# Patient Record
Sex: FEMALE | Race: WHITE | Employment: FULL TIME | URBAN - METROPOLITAN AREA
[De-identification: names, ages, dates, MRNs, and addresses within clinical notes are randomized per-mention and may not be internally consistent; named-entity substitution may affect disease eponyms.]

---

## 2018-10-31 ENCOUNTER — CLINICAL SUPPORT (OUTPATIENT)
Dept: PRIMARY CARE CLINIC | Age: 33
End: 2018-10-31

## 2018-10-31 DIAGNOSIS — Z00.8 ENCOUNTER FOR BIOMETRIC SCREENING: ICD-10-CM

## 2018-10-31 DIAGNOSIS — Z23 ENCOUNTER FOR IMMUNIZATION: Primary | ICD-10-CM

## 2018-10-31 NOTE — PATIENT INSTRUCTIONS
Vaccine Information Statement    Influenza (Flu) Vaccine (Inactivated or Recombinant): What you need to know    Many Vaccine Information Statements are available in Arabic and other languages. See www.immunize.org/vis  Hojas de Información Sobre Vacunas están disponibles en Español y en muchos otros idiomas. Visite www.immunize.org/vis    1. Why get vaccinated? Influenza (flu) is a contagious disease that spreads around the [place] every year, usually between October and May. Flu is caused by influenza viruses, and is spread mainly by coughing, sneezing, and close contact. Anyone can get flu. Flu strikes suddenly and can last several days. Symptoms vary by age, but can include:   fever/chills   sore throat   muscle aches   fatigue   cough   headache    runny or stuffy nose    Flu can also lead to pneumonia and blood infections, and cause diarrhea and seizures in children. If you have a medical condition, such as heart or lung disease, flu can make it worse. Flu is more dangerous for some people. Infants and young children, people 72years of age and older, pregnant women, and people with certain health conditions or a weakened immune system are at greatest risk. Each year thousands of people in the [facility] die from flu, and many more are hospitalized. Flu vaccine can:   keep you from getting flu,   make flu less severe if you do get it, and   keep you from spreading flu to your family and other people. 2. Inactivated and recombinant flu vaccines    A dose of flu vaccine is recommended every flu season. Children 6 months through 6years of age may need two doses during the same flu season. Everyone else needs only one dose each flu season.        Some inactivated flu vaccines contain a very small amount of a mercury-based preservative called thimerosal. Studies have not shown thimerosal in vaccines to be harmful, but flu vaccines that do not contain thimerosal are available. There is no live flu virus in flu shots. They cannot cause the flu. There are many flu viruses, and they are always changing. Each year a new flu vaccine is made to protect against three or four viruses that are likely to cause disease in the upcoming flu season. But even when the vaccine doesnt exactly match these viruses, it may still provide some protection    Flu vaccine cannot prevent:   flu that is caused by a virus not covered by the vaccine, or   illnesses that look like flu but are not. It takes about 2 weeks for protection to develop after vaccination, and protection lasts through the flu season. 3. Some people should not get this vaccine    Tell the person who is giving you the vaccine:     If you have any severe, life-threatening allergies. If you ever had a life-threatening allergic reaction after a dose of flu vaccine, or have a severe allergy to any part of this vaccine, you may be advised not to get vaccinated. Most, but not all, types of flu vaccine contain a small amount of egg protein.  If you ever had Guillain-Barré Syndrome (also called GBS). Some people with a history of GBS should not get this vaccine. This should be discussed with your doctor.  If you are not feeling well. It is usually okay to get flu vaccine when you have a mild illness, but you might be asked to come back when you feel better. 4. Risks of a vaccine reaction    With any medicine, including vaccines, there is a chance of reactions. These are usually mild and go away on their own, but serious reactions are also possible. Most people who get a flu shot do not have any problems with it.      Minor problems following a flu shot include:    soreness, redness, or swelling where the shot was given     hoarseness   sore, red or itchy eyes   cough   fever   aches   headache   itching   fatigue  If these problems occur, they usually begin soon after the shot and last 1 or 2 days. More serious problems following a flu shot can include the following:     There may be a small increased risk of Guillain-Barré Syndrome (GBS) after inactivated flu vaccine. This risk has been estimated at 1 or 2 additional cases per million people vaccinated. This is much lower than the risk of severe complications from flu, which can be prevented by flu vaccine.  Young children who get the flu shot along with pneumococcal vaccine (PCV13) and/or DTaP vaccine at the same time might be slightly more likely to have a seizure caused by fever. Ask your doctor for more information. Tell your doctor if a child who is getting flu vaccine has ever had a seizure. Problems that could happen after any injected vaccine:      People sometimes faint after a medical procedure, including vaccination. Sitting or lying down for about 15 minutes can help prevent fainting, and injuries caused by a fall. Tell your doctor if you feel dizzy, or have vision changes or ringing in the ears.  Some people get severe pain in the shoulder and have difficulty moving the arm where a shot was given. This happens very rarely.  Any medication can cause a severe allergic reaction. Such reactions from a vaccine are very rare, estimated at about 1 in a million doses, and would happen within a few minutes to a few hours after the vaccination. As with any medicine, there is a very remote chance of a vaccine causing a serious injury or death. The safety of vaccines is always being monitored. For more information, visit: www.cdc.gov/vaccinesafety/    5. What if there is a serious reaction? What should I look for?  Look for anything that concerns you, such as signs of a severe allergic reaction, very high fever, or unusual behavior.     Signs of a severe allergic reaction can include hives, swelling of the face and throat, difficulty breathing, a fast heartbeat, dizziness, and weakness - usually within a few minutes to a few hours after the vaccination. What should I do?  If you think it is a severe allergic reaction or other emergency that cant wait, call 9-1-1 and get the person to the nearest hospital. Otherwise, call your doctor.  Reactions should be reported to the Vaccine Adverse Event Reporting System (VAERS). Your doctor should file this report, or you can do it yourself through  the VAERS web site at www.vaers. [facility].gov, or by calling 8-100.120.1599. VAERS does not give medical advice. 6. The National Vaccine Injury Compensation Program    The [facility] Vaccine Injury Compensation Program (VICP) is a federal program that was created to compensate people who may have been injured by certain vaccines. Persons who believe they may have been injured by a vaccine can learn about the program and about filing a claim by calling 6-515.680.4617 or visiting the [employer] website at www.[facility].gov/vaccinecompensation. There is a time limit to file a claim for compensation. 7. How can I learn more?  Ask your healthcare provider. He or she can give you the vaccine package insert or suggest other sources of information.  Call your local or state health department.  Contact the Centers for Disease Control and Prevention (CDC):  - Call 6-322.853.3264 (1-800-CDC-INFO) or  - Visit CDCs website at www.cdc.gov/flu    Vaccine Information Statement   Inactivated Influenza Vaccine   8/7/2015  42 ARABELLA Ilannuvia Thompson [id number]    Department of Health and Human Services  Centers for Disease Control and Prevention    Office Use Only

## 2018-10-31 NOTE — PROGRESS NOTES
Chief Complaint   Patient presents with    Immunization/Injection    Labs Only   Kingsley Hamman  is a 28 y.o.  female  who present for Influenza immunizations/injections. She denies any symptoms , reactions or allergies that would exclude them from being immunized today. Injection given in left deltoid. Verbal order with read back given for vaccine by Sherman Bethea NP. Risks and adverse reactions were discussed and the VIS was given if applicable to patient. All questions were addressed. She was observed for 10 min post injection. There were no reactions observed. Pt also here for biometric screening. Mariola Muniz LPN   This note will not be viewable in [address]

## 2018-11-05 ENCOUNTER — TELEPHONE (OUTPATIENT)
Dept: PRIMARY CARE CLINIC | Age: 33
End: 2018-11-05

## 2019-02-22 ENCOUNTER — OFFICE VISIT (OUTPATIENT)
Dept: PRIMARY CARE CLINIC | Age: 34
End: 2019-02-22

## 2019-02-22 VITALS
HEIGHT: 62 IN | BODY MASS INDEX: 21.9 KG/M2 | TEMPERATURE: 98.1 F | OXYGEN SATURATION: 98 % | DIASTOLIC BLOOD PRESSURE: 77 MMHG | WEIGHT: 119 LBS | HEART RATE: 74 BPM | RESPIRATION RATE: 14 BRPM | SYSTOLIC BLOOD PRESSURE: 115 MMHG

## 2019-02-22 DIAGNOSIS — B37.9 CANDIDIASIS: ICD-10-CM

## 2019-02-22 DIAGNOSIS — J02.9 SORE THROAT: Primary | ICD-10-CM

## 2019-02-22 DIAGNOSIS — J02.0 STREP THROAT: ICD-10-CM

## 2019-02-22 LAB
S PYO AG THROAT QL: NEGATIVE
VALID INTERNAL CONTROL?: YES

## 2019-02-22 RX ORDER — AMOXICILLIN 875 MG/1
875 TABLET, FILM COATED ORAL 2 TIMES DAILY
Qty: 20 TAB | Refills: 0 | Status: SHIPPED | OUTPATIENT
Start: 2019-02-22 | End: 2019-03-04

## 2019-02-22 RX ORDER — FLUCONAZOLE 150 MG/1
150 TABLET ORAL DAILY
Qty: 2 TAB | Refills: 0 | Status: SHIPPED | OUTPATIENT
Start: 2019-02-22 | End: 2019-02-24

## 2019-02-23 NOTE — PROGRESS NOTES
Subjective:   Tresa Willson is a 28 y.o. female who complains of congestion, sore throat, swollen glands, pain while swallowing, white spots in throat, hoarseness and enlarged tonsils for 2 days. She denies a history of chest pain, dizziness, fatigue, myalgias, nausea, shortness of breath, sweats, vomiting, weakness, weight loss and wheezing. Patient does not smoke cigarettes. Relevant PMH: No pertinent additional PMH. Objective:      Visit Vitals  /77   Pulse 74   Temp 98.1 °F (36.7 °C) (Oral)   Resp 14   Ht 5' 2\" (1.575 m)   Wt 119 lb (54 kg)   SpO2 98%   BMI 21.77 kg/m²      Appears alert, well appearing, and in no distress, oriented to person, place, and time, normal appearing weight, acyanotic, in no respiratory distress and well hydrated. Ears: bilateral TM's and external ear canals normal  Oropharynx: erythematous and exudate noted  Neck: bilateral symmetric anterior adenopathy  Lungs: clear to auscultation, no wheezes, rales or rhonchi, symmetric air entry  The abdomen is soft without tenderness or hepatosplenomegaly. Rapid Strep test is negative    Assessment/Plan:   strep pharyngitis  Per orders. Gargle, use acetaminophen or other OTC analgesic, and take Rx fully as prescribed. Call if other family members develop similar symptoms. See prn. ICD-10-CM ICD-9-CM    1. Sore throat J02.9 462 AMB POC RAPID STREP A   2. Strep throat J02.0 034.0 amoxicillin (AMOXIL) 875 mg tablet   3. Candidiasis B37.9 112.9 fluconazole (DIFLUCAN) 150 mg tablet   .

## 2019-02-23 NOTE — PATIENT INSTRUCTIONS

## 2019-02-28 ENCOUNTER — OFFICE VISIT (OUTPATIENT)
Dept: PRIMARY CARE CLINIC | Age: 34
End: 2019-02-28

## 2019-02-28 VITALS
HEART RATE: 95 BPM | WEIGHT: 116.4 LBS | BODY MASS INDEX: 21.42 KG/M2 | SYSTOLIC BLOOD PRESSURE: 118 MMHG | TEMPERATURE: 98.6 F | RESPIRATION RATE: 18 BRPM | HEIGHT: 62 IN | DIASTOLIC BLOOD PRESSURE: 81 MMHG | OXYGEN SATURATION: 98 %

## 2019-02-28 DIAGNOSIS — J11.1 INFLUENZA: Primary | ICD-10-CM

## 2019-02-28 LAB
QUICKVUE INFLUENZA TEST: POSITIVE
VALID INTERNAL CONTROL?: YES

## 2019-02-28 RX ORDER — OSELTAMIVIR PHOSPHATE 75 MG/1
75 CAPSULE ORAL 2 TIMES DAILY
Qty: 10 CAP | Refills: 0 | Status: SHIPPED | OUTPATIENT
Start: 2019-02-28 | End: 2019-03-05

## 2019-02-28 NOTE — PATIENT INSTRUCTIONS
Influenza (Flu): Care Instructions  Your Care Instructions    Influenza (flu) is an infection in the lungs and breathing passages. It is caused by the influenza virus. There are different strains, or types, of the flu virus from year to year. Unlike the common cold, the flu comes on suddenly and the symptoms, such as a cough, congestion, fever, chills, fatigue, aches, and pains, are more severe. These symptoms may last up to 10 days. Although the flu can make you feel very sick, it usually doesn't cause serious health problems. Home treatment is usually all you need for flu symptoms. But your doctor may prescribe antiviral medicine to prevent other health problems, such as pneumonia, from developing. Older people and those who have a long-term health condition, such as lung disease, are most at risk for having pneumonia or other health problems. Follow-up care is a key part of your treatment and safety. Be sure to make and go to all appointments, and call your doctor if you are having problems. It's also a good idea to know your test results and keep a list of the medicines you take. How can you care for yourself at home? · Get plenty of rest.  · Drink plenty of fluids, enough so that your urine is light yellow or clear like water. If you have kidney, heart, or liver disease and have to limit fluids, talk with your doctor before you increase the amount of fluids you drink. · Take an over-the-counter pain medicine if needed, such as acetaminophen (Tylenol), ibuprofen (Advil, Motrin), or naproxen (Aleve), to relieve fever, headache, and muscle aches. Read and follow all instructions on the label. No one younger than 20 should take aspirin. It has been linked to Reye syndrome, a serious illness. · Do not smoke. Smoking can make the flu worse. If you need help quitting, talk to your doctor about stop-smoking programs and medicines. These can increase your chances of quitting for good.   · Breathe moist air from a hot shower or from a sink filled with hot water to help clear a stuffy nose. · Before you use cough and cold medicines, check the label. These medicines may not be safe for young children or for people with certain health problems. · If the skin around your nose and lips becomes sore, put some petroleum jelly on the area. · To ease coughing:  ? Drink fluids to soothe a scratchy throat. ? Suck on cough drops or plain hard candy. ? Take an over-the-counter cough medicine that contains dextromethorphan to help you get some sleep. Read and follow all instructions on the label. ? Raise your head at night with an extra pillow. This may help you rest if coughing keeps you awake. · Take any prescribed medicine exactly as directed. Call your doctor if you think you are having a problem with your medicine. To avoid spreading the flu  · Wash your hands regularly, and keep your hands away from your face. · Stay home from school, work, and other public places until you are feeling better and your fever has been gone for at least 24 hours. The fever needs to have gone away on its own without the help of medicine. · Ask people living with you to talk to their doctors about preventing the flu. They may get antiviral medicine to keep from getting the flu from you. · To prevent the flu in the future, get a flu vaccine every fall. Encourage people living with you to get the vaccine. · Cover your mouth when you cough or sneeze. When should you call for help? Call 911 anytime you think you may need emergency care.  For example, call if:    · You have severe trouble breathing.    Call your doctor now or seek immediate medical care if:    · You have new or worse trouble breathing.     · You seem to be getting much sicker.     · You feel very sleepy or confused.     · You have a new or higher fever.     · You get a new rash.    Watch closely for changes in your health, and be sure to contact your doctor if:    · You begin to get better and then get worse.     · You are not getting better after 1 week. Where can you learn more? Go to http://yamileth-jameson.info/. Enter K720 in the search box to learn more about \"Influenza (Flu): Care Instructions. \"  Current as of: September 5, 2018  Content Version: 11.9  © 1096-1770 [employer]. Care instructions adapted under license by [employer] (which disclaims liability or warranty for this information). If you have questions about a medical condition or this instruction, always ask your healthcare professional. [place] [address] any warranty or liability for your use of this information.

## 2019-02-28 NOTE — PROGRESS NOTES
Subjective:   Pelon Lackey is a 28 y.o. female who complains of coryza, congestion, sneezing, dry cough, myalgias, headache and chills for 1 day, gradually worsening since that time. Denies any fever, nausea/vomiting, diarrhea but stomach feels unsettled. 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 Pt seen here  for sore throat, RST neg but started on Amoxil. Throat is feeling better. She denies a history of shortness of breath and wheezing. Patient does not smoke cigarettes. Relevant PMH:   Past Medical History:   Diagnosis Date    Anemia NEC     HX OTHER MEDICAL     ADHD     HX OTHER MEDICAL     dysmenorrhea    HX OTHER MEDICAL     gardisil completed    Rh negative status during pregnancy, antepartum 2012     Past Surgical History:   Procedure Laterality Date     DELIVERY ONLY      HX  SECTION      HX WISDOM TEETH EXTRACTION       No Known Allergies      Review of Systems  Pertinent items are noted in HPI. Objective:     Visit Vitals  /81 (BP 1 Location: Left arm, BP Patient Position: Sitting)   Pulse 95   Temp 98.6 °F (37 °C) (Oral)   Resp 18   Ht 5' 2\" (1.575 m)   Wt 116 lb 6.4 oz (52.8 kg)   SpO2 98%   Breastfeeding? No   BMI 21.29 kg/m²     General:  alert, cooperative, no distress, ill appearing, not toxic   Eyes: Watery, no erythema   Ears: normal TM's and external ear canals AU   Sinuses: Normal paranasal sinuses without tenderness   Mouth:  Lips, mucosa, and tongue normal. Teeth and gums normal and normal findings: oropharynx pink & moist without lesions or evidence of thrush   Neck: supple, symmetrical, trachea midline and no adenopathy. Heart: S1 and S2 normal, no murmurs noted. Lungs: clear to auscultation bilaterally   Abdomen: soft, non-tender. Bowel sounds normal. No masses,  no organomegaly        Rapid flu - + influenza A    Assessment/Plan:       ICD-10-CM ICD-9-CM    1.  Influenza J11.1 487.1      Orders Placed This Encounter    oseltamivir (TAMIFLU) 75 mg capsule     Discontinue Amoxil. Suggested symptomatic OTC remedies. RTC prn. Carolina Jaeger, ELIZABETH  This note will not be viewable in [address]

## 2019-02-28 NOTE — PROGRESS NOTES
Chief Complaint   Patient presents with    Generalized Body Aches    Nasal Congestion    Chills   Pt c/o headache,body aches, nasal congestion and fatigue, pt currently on abx   This note will not be viewable in MyChart.

## 2019-10-09 ENCOUNTER — CLINICAL SUPPORT (OUTPATIENT)
Dept: PRIMARY CARE CLINIC | Age: 34
End: 2019-10-09

## 2019-10-09 DIAGNOSIS — Z23 ENCOUNTER FOR IMMUNIZATION: Primary | ICD-10-CM

## 2019-10-09 DIAGNOSIS — Z13.220 SCREENING CHOLESTEROL LEVEL: ICD-10-CM

## 2019-10-10 NOTE — PATIENT INSTRUCTIONS
Vaccine Information Statement    Influenza (Flu) Vaccine (Inactivated or Recombinant): What You Need to Know    Many Vaccine Information Statements are available in Latvian and other languages. See www.immunize.org/vis  Hojas de información sobre vacunas están disponibles en español y en muchos otros idiomas. Visite www.immunize.org/vis    1. Why get vaccinated? Influenza vaccine can prevent influenza (flu). Flu is a contagious disease that spreads around the [facility] every year, usually between October and May. Anyone can get the flu, but it is more dangerous for some people. Infants and young children, people 72years of age and older, pregnant women, and people with certain health conditions or a weakened immune system are at greatest risk of flu complications. Pneumonia, bronchitis, sinus infections and ear infections are examples of flu-related complications. If you have a medical condition, such as heart disease, cancer or diabetes, flu can make it worse. Flu can cause fever and chills, sore throat, muscle aches, fatigue, cough, headache, and runny or stuffy nose. Some people may have vomiting and diarrhea, though this is more common in children than adults. Each year thousands of people in the [facility] die from flu, and many more are hospitalized. Flu vaccine prevents millions of illnesses and flu-related visits to the doctor each year. 2. Influenza vaccines     CDC recommends everyone 10months of age and older get vaccinated every flu season. Children 6 months through 6years of age may need 2 doses during a single flu season. Everyone else needs only 1 dose each flu season. It takes about 2 weeks for protection to develop after vaccination. There are many flu viruses, and they are always changing. Each year a new flu vaccine is made to protect against three or four viruses that are likely to cause disease in the upcoming flu season.  Even when the vaccine doesnt exactly match these viruses, it may still provide some protection. Influenza vaccine does not cause flu. Influenza vaccine may be given at the same time as other vaccines. 3. Talk with your health care provider    Tell your vaccine provider if the person getting the vaccine:   Has had an allergic reaction after a previous dose of influenza vaccine, or has any severe, life-threatening allergies.  Has ever had Guillain-Barré Syndrome (also called GBS). In some cases, your health care provider may decide to postpone influenza vaccination to a future visit. People with minor illnesses, such as a cold, may be vaccinated. People who are moderately or severely ill should usually wait until they recover before getting influenza vaccine. Your health care provider can give you more information. 4. Risks of a reaction     Soreness, redness, and swelling where shot is given, fever, muscle aches, and headache can happen after influenza vaccine.  There may be a very small increased risk of Guillain-Barré Syndrome (GBS) after inactivated influenza vaccine (the flu shot). [place] children who get the flu shot along with pneumococcal vaccine (PCV13), and/or DTaP vaccine at the same time might be slightly more likely to have a seizure caused by fever. Tell your health care provider if a child who is getting flu vaccine has ever had a seizure. People sometimes faint after medical procedures, including vaccination. Tell your provider if you feel dizzy or have vision changes or ringing in the ears. As with any medicine, there is a very remote chance of a vaccine causing a severe allergic reaction, other serious injury, or death. 5. What if there is a serious problem? An allergic reaction could occur after the vaccinated person leaves the clinic.  If you see signs of a severe allergic reaction (hives, swelling of the face and throat, difficulty breathing, a fast heartbeat, dizziness, or weakness), call 9-1-1 and get the person to the nearest hospital.    For other signs that concern you, call your health care provider. Adverse reactions should be reported to the Vaccine Adverse Event Reporting System (VAERS). Your health care provider will usually file this report, or you can do it yourself. Visit the VAERS website at www.vaers. [facility].gov or call 7-883.637.2453. VAERS is only for reporting reactions, and VAERS staff do not give medical advice. 6. The National Vaccine Injury Compensation Program    The [facility] Vaccine Injury Compensation Program (VICP) is a federal program that was created to compensate people who may have been injured by certain vaccines. Visit the VICP website at [facility].gov/vaccinecompensation or call 4-170.217.7859 to learn about the program and about filing a claim. There is a time limit to file a claim for compensation. 7. How can I learn more?  Ask your health care provider.  Call your local or state health department.  Contact the Centers for Disease Control and Prevention (CDC):  - Call 6-919.283.9970 (1-800-CDC-INFO) or  - Visit CDCs influenza website at www.cdc.gov/flu    Vaccine Information Statement (Interim)  Inactivated Influenza Vaccine   8/15/2019  42 U. Gar Presser [id number]   Department of Health and Human Services  Centers for Disease Control and Prevention    Office Use Only

## 2020-02-27 ENCOUNTER — OFFICE VISIT (OUTPATIENT)
Dept: PRIMARY CARE CLINIC | Age: 35
End: 2020-02-27

## 2020-02-27 VITALS
BODY MASS INDEX: 21.16 KG/M2 | TEMPERATURE: 99.9 F | HEIGHT: 62 IN | WEIGHT: 115 LBS | SYSTOLIC BLOOD PRESSURE: 120 MMHG | HEART RATE: 98 BPM | RESPIRATION RATE: 16 BRPM | OXYGEN SATURATION: 100 % | DIASTOLIC BLOOD PRESSURE: 79 MMHG

## 2020-02-27 DIAGNOSIS — J06.9 VIRAL URI WITH COUGH: Primary | ICD-10-CM

## 2020-02-27 DIAGNOSIS — R68.89 FLU-LIKE SYMPTOMS: ICD-10-CM

## 2020-02-27 LAB
FLUAV+FLUBV AG NOSE QL IA.RAPID: NEGATIVE POS/NEG
FLUAV+FLUBV AG NOSE QL IA.RAPID: NEGATIVE POS/NEG
VALID INTERNAL CONTROL?: YES

## 2020-02-27 NOTE — PATIENT INSTRUCTIONS
Viral Respiratory Infection: Care Instructions  Your Care Instructions    Viruses are very small organisms. They grow in number after they enter your body. There are many types that cause different illnesses, such as colds and the mumps. The symptoms of a viral respiratory infection often start quickly. They include a fever, sore throat, and runny nose. You may also just not feel well. Or you may not want to eat much. Most viral respiratory infections are not serious. They usually get better with time and self-care. Antibiotics are not used to treat a viral infection. That's because antibiotics will not help cure a viral illness. In some cases, antiviral medicine can help your body fight a serious viral infection. Follow-up care is a key part of your treatment and safety. Be sure to make and go to all appointments, and call your doctor if you are having problems. It's also a good idea to know your test results and keep a list of the medicines you take. How can you care for yourself at home? · Rest as much as possible until you feel better. · Be safe with medicines. Take your medicine exactly as prescribed. Call your doctor if you think you are having a problem with your medicine. You will get more details on the specific medicine your doctor prescribes. · Take an over-the-counter pain medicine, such as acetaminophen (Tylenol), ibuprofen (Advil, Motrin), or naproxen (Aleve), as needed for pain and fever. Read and follow all instructions on the label. Do not give aspirin to anyone younger than 20. It has been linked to Reye syndrome, a serious illness. · Drink plenty of fluids, enough so that your urine is light yellow or clear like water. Hot fluids, such as tea or soup, may help relieve congestion in your nose and throat. If you have kidney, heart, or liver disease and have to limit fluids, talk with your doctor before you increase the amount of fluids you drink.   · Try to clear mucus from your lungs by breathing deeply and coughing. · Gargle with warm salt water once an hour. This can help reduce swelling and throat pain. Use 1 teaspoon of salt mixed in 1 cup of warm water. · Do not smoke or allow others to smoke around you. If you need help quitting, talk to your doctor about stop-smoking programs and medicines. These can increase your chances of quitting for good. To avoid spreading the virus  · Cough or sneeze into a tissue. Then throw the tissue away. · If you don't have a tissue, use your hand to cover your cough or sneeze. Then clean your hand. You can also cough into your sleeve. · Wash your hands often. Use soap and warm water. Wash for 15 to 20 seconds each time. · If you don't have soap and water near you, you can clean your hands with alcohol wipes or gel. When should you call for help? Call your doctor now or seek immediate medical care if:    · You have a new or higher fever.     · Your fever lasts more than 48 hours.     · You have trouble breathing.     · You have a fever with a stiff neck or a severe headache.     · You are sensitive to light.     · You feel very sleepy or confused.    Watch closely for changes in your health, and be sure to contact your doctor if:    · You do not get better as expected. Where can you learn more? Go to http://yamileth-jameson.info/. Enter K743 in the search box to learn more about \"Viral Respiratory Infection: Care Instructions. \"  Current as of: June 9, 2019  Content Version: 12.2  © 0076-1886 [employer]. Care instructions adapted under license by [employer] (which disclaims liability or warranty for this information). If you have questions about a medical condition or this instruction, always ask your healthcare professional. [address] any warranty or liability for your use of this information.

## 2020-02-27 NOTE — PROGRESS NOTES
Rm 6    Chief Complaint   Patient presents with    Flu Like Symptoms     body aches,nasal congestion, fever, chills x 2 days       Visit Vitals  /79 (BP 1 Location: Right arm, BP Patient Position: Sitting)   Pulse 98   Temp 99.9 °F (37.7 °C) (Oral)   Resp 16   Ht 5' 2\" (1.575 m)   Wt 115 lb (52.2 kg)   SpO2 100%   BMI 21.03 kg/m²

## 2020-02-27 NOTE — PROGRESS NOTES
Subjective:   Jonathan Lehman is a 27 y.o. female who complains of congestion, sore throat, dry cough, myalgias and headache for 3 days, gradually worsening since that time. Developed fever today. She denies a history of nausea, shortness of breath, vomiting and wheezing. Had flu shot. Evaluation to date: none. Treatment to date: OTC products. Patient does not smoke cigarettes. Relevant PMH:   Past Medical History:   Diagnosis Date    Anemia NEC     HX OTHER MEDICAL     ADHD     HX OTHER MEDICAL     dysmenorrhea    HX OTHER MEDICAL     gardisil completed    Rh negative status during pregnancy, antepartum 2012     Past Surgical History:   Procedure Laterality Date     DELIVERY ONLY      HX  SECTION      HX WISDOM TEETH EXTRACTION       No Known Allergies        Review of Systems  Pertinent items are noted in HPI. Objective:     Visit Vitals  /79 (BP 1 Location: Right arm, BP Patient Position: Sitting)   Pulse 98   Temp 99.9 °F (37.7 °C) (Oral)   Resp 16   Ht 5' 2\" (1.575 m)   Wt 115 lb (52.2 kg)   SpO2 100%   BMI 21.03 kg/m²     General:  alert, cooperative, no distress, sickly appearance but not toxic   Eyes: negative   Ears: normal TM's and external ear canals AU   Sinuses: Normal paranasal sinuses without tenderness   Mouth:  Lips, mucosa, and tongue normal. Teeth and gums normal and normal findings: oropharynx pink & moist without lesions or evidence of thrush   Neck: supple, symmetrical, trachea midline and no adenopathy. Heart: S1 and S2 normal, no murmurs noted. Lungs: clear to auscultation bilaterally   Abdomen: soft, non-tender. Bowel sounds normal. No masses,  no organomegaly          Rapid flu swab - negative    Assessment/Plan:       ICD-10-CM ICD-9-CM    1. Viral URI with cough J06.9 465.9     B97.89     2. Flu-like symptoms R68.89 780.99 AMB POC LIZBETH INFLUENZA A/B TEST     Likely influenza despite negative flu swab.     Suggested symptomatic OTC remedies. RTC prn. Discussed diagnosis and treatment of viral URIs. Clyde Souza, NP  This note will not be viewable in [address]

## 2021-03-31 ENCOUNTER — CLINICAL SUPPORT (OUTPATIENT)
Dept: PRIMARY CARE CLINIC | Age: 36
End: 2021-03-31

## 2021-03-31 DIAGNOSIS — Z13.220 SCREENING CHOLESTEROL LEVEL: Primary | ICD-10-CM

## 2021-03-31 NOTE — PROGRESS NOTES
Chief Complaint   Patient presents with    Labs Only     Patient here for labs for Biometric screening

## 2021-09-18 ENCOUNTER — OFFICE VISIT (OUTPATIENT)
Dept: PRIMARY CARE CLINIC | Age: 36
End: 2021-09-18

## 2021-09-18 VITALS
HEART RATE: 83 BPM | SYSTOLIC BLOOD PRESSURE: 109 MMHG | BODY MASS INDEX: 22.49 KG/M2 | DIASTOLIC BLOOD PRESSURE: 69 MMHG | TEMPERATURE: 98 F | HEIGHT: 62 IN | WEIGHT: 122.2 LBS | RESPIRATION RATE: 16 BRPM | OXYGEN SATURATION: 98 %

## 2021-09-18 DIAGNOSIS — N89.8 VAGINA ITCHING: ICD-10-CM

## 2021-09-18 DIAGNOSIS — N76.0 ACUTE VAGINITIS: Primary | ICD-10-CM

## 2021-09-18 DIAGNOSIS — N89.8 VAGINAL DISCHARGE: ICD-10-CM

## 2021-09-18 PROCEDURE — 99213 OFFICE O/P EST LOW 20 MIN: CPT | Performed by: NURSE PRACTITIONER

## 2021-09-18 RX ORDER — CLOTRIMAZOLE AND BETAMETHASONE DIPROPIONATE 10; .64 MG/G; MG/G
CREAM TOPICAL 2 TIMES DAILY
Qty: 15 G | Refills: 0 | Status: SHIPPED | OUTPATIENT
Start: 2021-09-18

## 2021-09-18 RX ORDER — DEXTROAMPHETAMINE SACCHARATE, AMPHETAMINE ASPARTATE, DEXTROAMPHETAMINE SULFATE AND AMPHETAMINE SULFATE 2.5; 2.5; 2.5; 2.5 MG/1; MG/1; MG/1; MG/1
TABLET ORAL
COMMUNITY
Start: 2019-02-18

## 2021-09-18 RX ORDER — FLUCONAZOLE 150 MG/1
150 TABLET ORAL DAILY
Qty: 1 TABLET | Refills: 0 | Status: SHIPPED | OUTPATIENT
Start: 2021-09-18 | End: 2021-09-19

## 2021-09-18 NOTE — PATIENT INSTRUCTIONS
Vaginitis: Care Instructions  Your Care Instructions     Vaginitis is soreness or infection of the vagina. This common problem can cause itching and burning. And it can cause a change in vaginal discharge. Sometimes it can cause pain during sex. Vaginitis may be caused by bacteria, yeast, or other germs. Some infections that cause it are caught from a sexual partner. Bath products, spermicides, and douches can irritate the vagina too. Some women have this problem during and after menopause. A drop in estrogen levels during this time can cause dryness, soreness, and pain during sex. Your doctor can give you medicine to treat an infection. And home care may help you feel better. For certain types of infections, your sex partner must be treated too. Follow-up care is a key part of your treatment and safety. Be sure to make and go to all appointments, and call your doctor if you are having problems. It's also a good idea to know your test results and keep a list of the medicines you take. How can you care for yourself at home? · If your doctor prescribed antibiotics, take them as directed. Do not stop taking them just because you feel better. You need to take the full course of antibiotics. · Take your medicines exactly as prescribed. Call your doctor if you think you are having a problem with your medicine. · Do not eat or drink anything that has alcohol if you are taking metronidazole (Flagyl). · If you have a yeast infection, use over-the-counter products as your doctor tells you to. Or take medicine your doctor prescribes exactly as directed. · Wash your vaginal area daily with water. You also can use a mild, unscented soap if you want. · Do not use scented bath products. And do not use vaginal sprays or douches. · Put a washcloth soaked in cool water on the area to relieve itching. Or you can take cool baths.   · If you have dryness because of menopause, use estrogen cream or pills that your doctor prescribes. · Ask your doctor about when it is okay to have sex. · Use a personal lubricant before sex if you have dryness. Examples are Astroglide, K-Y Jelly, and Wet Lubricant Gel. · Ask your doctor if your sex partner also needs treatment. When should you call for help? Call your doctor now or seek immediate medical care if:    · You have a fever and pelvic pain. Watch closely for changes in your health, and be sure to contact your doctor if:    · You have bleeding other than your period.     · You do not get better as expected. Where can you learn more? Go to http://www.gray.com/  Enter [id number] in the search box to learn more about \"Vaginitis: Care Instructions. \"  Current as of: February 11, 2021               Content Version: 13.0  © 6835-8852 [employer]. Care instructions adapted under license by [employer] (which disclaims liability or warranty for this information). If you have questions about a medical condition or this instruction, always ask your healthcare professional. [address] any warranty or liability for your use of this information.

## 2021-09-18 NOTE — PROGRESS NOTES
Subjective:   38 y.o. female complains of white vaginal discharge for 2-3 days. She believes she has yeast infection. C/o vaginal itching and odor. Denies abnormal vaginal bleeding or significant pelvic pain or  fever. No UTI symptoms. Denies history of known exposure to STD. Has tried Monistat otc without any relief. No LMP recorded (lmp unknown). (Menstrual status: IUD). Objective:   She appears well, afebrile. Abdomen: benign, soft, nontender, no masses. Pelvic Exam: exam declined by the patient. Pt unable to provide urine sample at time of visit. Assessment/Plan:       ICD-10-CM ICD-9-CM    1. Acute vaginitis  N76.0 616.10 NUSWAB VAGINITIS PLUS      NUSWAB VAGINITIS PLUS   2. Vaginal discharge  N89.8 623.5 NUSWAB VAGINITIS PLUS      NUSWAB VAGINITIS PLUS   3. Vagina itching  N89.8 698.1 NUSWAB VAGINITIS PLUS      NUSWAB VAGINITIS PLUS       GC and chlamydia DNA  probe sent to lab. Treatment: Diflucan per orders. ROV prn if symptoms persist or worsen.      Joe Cordero, ELIZABETH

## 2021-09-18 NOTE — PROGRESS NOTES
Chief Complaint   Patient presents with    Vaginal Itching     Patient in room #6 with complaints of vaginal itching since Wednesday, used Monistat but worse

## 2021-09-24 LAB
A VAGINAE DNA VAG QL NAA+PROBE: ABNORMAL SCORE
BVAB2 DNA VAG QL NAA+PROBE: ABNORMAL SCORE
C ALBICANS DNA VAG QL NAA+PROBE: POSITIVE
C GLABRATA DNA VAG QL NAA+PROBE: NEGATIVE
C TRACH DNA VAG QL NAA+PROBE: NEGATIVE
MEGA1 DNA VAG QL NAA+PROBE: ABNORMAL SCORE
N GONORRHOEA DNA VAG QL NAA+PROBE: NEGATIVE
T VAGINALIS DNA VAG QL NAA+PROBE: NEGATIVE

## 2021-09-24 NOTE — PROGRESS NOTES
Spoke with patient, verified patient identifiers and lab result given to patient, per [facility],  and understanding verbalized. No further questions at this time.

## 2021-09-24 NOTE — PROGRESS NOTES
Please inform pt that testing does indicate she has yeast infection and she has been treated for this. Is she feeling better? Thanks.

## 2022-08-14 ENCOUNTER — OFFICE VISIT (OUTPATIENT)
Dept: URGENT CARE | Age: 37
End: 2022-08-14

## 2022-08-14 VITALS
BODY MASS INDEX: 21.44 KG/M2 | SYSTOLIC BLOOD PRESSURE: 121 MMHG | HEART RATE: 76 BPM | OXYGEN SATURATION: 100 % | WEIGHT: 121 LBS | DIASTOLIC BLOOD PRESSURE: 85 MMHG | HEIGHT: 63 IN | TEMPERATURE: 98.2 F | RESPIRATION RATE: 16 BRPM

## 2022-08-14 DIAGNOSIS — K59.00 CONSTIPATION, UNSPECIFIED CONSTIPATION TYPE: ICD-10-CM

## 2022-08-14 DIAGNOSIS — K21.9 GERD WITHOUT ESOPHAGITIS: Primary | ICD-10-CM

## 2022-08-14 NOTE — PATIENT INSTRUCTIONS
Use OTC PEPCID (twice daily) or Prilosec (once daily) for the next 2 weeks and avoid acidic, spicy, greasy, tomato-based, and dairy foods. Avoid NSAIDs- Ibuprofen, Motrin, Advil, and Aleve. Try Tylenol or heat for pain. Continue drinking plenty of fluids and eat small, frequent meals. Eat Bran cereal daily also. Try MAG CITRATE if you are without a bowel movement for more than 2 days (especially with use of stool softener): drink 1 bottle and wait 30 min-1 hr, if still without bowel movement, drink the other bottle      May use MIRALAX if water alone is not helping you to have DAILY or near daily bowel movements. Follow Up with PCP in 3-5 days if no improvement/routine care. Call to be seen sooner or return Here/ER, if no improvement with treatments advised/prescribed or symptoms/pain worsen (develop fever, pain worsens significantly, or develop NEW symptoms).

## 2022-08-14 NOTE — PROGRESS NOTES
HISTORY OF PRESENT ILLNESS  Liana Britt is a 39 y.o. female. Patient presents with:  Abdominal Pain: Pt here today with complaint of abdominal pain since Friday. Taking Malox and Tums without relief. No bowel movement since Thursday. Reports feeling full and reports loss of appetite. Pain is in upper abdomen. The history is provided by the patient. Abdominal Pain   This is a new problem. The current episode started more than 2 days ago. The problem occurs constantly. The problem has been gradually worsening. The pain is associated with eating (any pressure to stomach like touching or lying prone). The pain is moderate. Associated symptoms include constipation (LBM: Thursday). Pertinent negatives include no fever, no belching, no diarrhea, no flatus, no melena, no nausea, no vomiting, no dysuria, no headaches, no arthralgias, no myalgias and no chest pain. Associated symptoms comments: Decreased appetite, bloating. The pain is worsened by certain positions and eating. The pain is relieved by Nothing (tried tums, maalox, and took a prilosec last night. Takes \"stool softener\" daily also). Patient Active Problem List   Diagnosis Code    ROM (rupture of membranes), premature O42.90    Rh negative status during pregnancy, antepartum O26.899, Z67.91    Pregnancy Z34.90     Patient Active Problem List    Diagnosis Date Noted    Pregnancy 05/22/2015    ROM (rupture of membranes), premature 02/18/2012    Rh negative status during pregnancy, antepartum 02/18/2012     Current Outpatient Medications   Medication Sig Dispense Refill    dextroamphetamine-amphetamine (AdderalL) 10 mg tablet Adderall 10 mg tablet   prescribed by non [address] provider      clotrimazole-betamethasone (LOTRISONE) topical cream Apply  to affected area two (2) times a day. 15 g 0    DAILY MULTI-VITAMIN PO 1 Tablet daily.       levonorgestreL (MIRENA) 20 mcg/24 hours (5 yrs) 52 mg IUD Mirena 20 mcg/24 hours (5 yrs) 52 mg intrauterine device Inserted 8/11/15      dextroamphetamine-amphetamine (ADDERALL) 20 mg tablet        No Known Allergies  Past Medical History:   Diagnosis Date    Anemia NEC     HX OTHER MEDICAL     ADHD     HX OTHER MEDICAL     dysmenorrhea    HX OTHER MEDICAL     gardisil completed    Rh negative status during pregnancy, antepartum 2012     Past Surgical History:   Procedure Laterality Date    HX  SECTION      HX WISDOM TEETH EXTRACTION      [place]  DELIVERY ONLY       Family History   Problem Relation Age of Onset    Stroke Maternal Grandmother     Diabetes Maternal Grandmother     Cancer Maternal Grandfather     Diabetes Maternal Grandfather     Heart Disease Paternal Grandfather     Cancer Mother         Throat and Tongue    Cancer Paternal Grandmother         Breast     Social History     Tobacco Use    Smoking status: Never    Smokeless tobacco: Never   Substance Use Topics    Alcohol use: No     Alcohol/week: 0.0 standard drinks          Review of Systems   Constitutional:  Negative for chills, fever and malaise/fatigue. HENT:  Negative for congestion and sore throat. Respiratory:  Negative for cough and shortness of breath. Cardiovascular:  Negative for chest pain. Gastrointestinal:  Positive for abdominal pain (upper) and constipation (LBM: Thursday). Negative for diarrhea, flatus, melena, nausea and vomiting. Genitourinary:  Negative for dysuria. Musculoskeletal:  Negative for arthralgias and myalgias. Neurological:  Negative for headaches. All other systems reviewed and are negative. Physical Exam  Vitals and nursing note reviewed. Constitutional:       General: She is not in acute distress. Appearance: She is well-developed. She is not diaphoretic. HENT:      Head: Normocephalic and atraumatic. Right Ear: External ear normal.      Left Ear: External ear normal.   Cardiovascular:      Rate and Rhythm: Normal rate.    Pulmonary:      Effort: Pulmonary effort is normal. No respiratory distress. Abdominal:      General: Abdomen is flat. Bowel sounds are normal. There is no distension. Palpations: Abdomen is soft. Tenderness: There is abdominal tenderness in the epigastric area. Neurological:      Mental Status: She is alert and oriented to person, place, and time. Psychiatric:         Behavior: Behavior normal.         Judgment: Judgment normal.       ASSESSMENT and PLAN  CLINICAL IMPRESSION:     ICD-10-CM ICD-9-CM    1. GERD without esophagitis  K21.9 530.81       2. Constipation, unspecified constipation type  K59.00 564.00             Plan:  F/U with PCP/Specialty in 3-5 days INI  No orders of the defined types were placed in this encounter. The patients condition was discussed with the patient and they understand. The patient is to follow up with PCP. If signs and symptoms become worse the pt is to go to the ER. The patient is to take medications as prescribed.

## 2022-08-15 ENCOUNTER — APPOINTMENT (OUTPATIENT)
Dept: GENERAL RADIOLOGY | Age: 37
End: 2022-08-15
Attending: EMERGENCY MEDICINE
Payer: COMMERCIAL

## 2022-08-15 ENCOUNTER — HOSPITAL ENCOUNTER (EMERGENCY)
Age: 37
Discharge: HOME | End: 2022-08-15
Attending: EMERGENCY MEDICINE
Payer: COMMERCIAL

## 2022-08-15 ENCOUNTER — APPOINTMENT (OUTPATIENT)
Dept: ULTRASOUND IMAGING | Age: 37
End: 2022-08-15
Attending: EMERGENCY MEDICINE
Payer: COMMERCIAL

## 2022-08-15 VITALS
WEIGHT: 120.81 LBS | RESPIRATION RATE: 16 BRPM | TEMPERATURE: 98 F | SYSTOLIC BLOOD PRESSURE: 106 MMHG | HEIGHT: 63 IN | DIASTOLIC BLOOD PRESSURE: 70 MMHG | HEART RATE: 70 BPM | OXYGEN SATURATION: 99 % | BODY MASS INDEX: 21.41 KG/M2

## 2022-08-15 DIAGNOSIS — R10.13 ABDOMINAL PAIN, EPIGASTRIC: Primary | ICD-10-CM

## 2022-08-15 LAB
ALBUMIN SERPL-MCNC: 4 G/DL (ref 3.5–5)
ALBUMIN/GLOB SERPL: 1.1 {RATIO} (ref 1.1–2.2)
ALP SERPL-CCNC: 57 U/L (ref 45–117)
ALT SERPL-CCNC: 18 U/L (ref 12–78)
ANION GAP SERPL CALC-SCNC: 5 MMOL/L (ref 5–15)
APPEARANCE UR: ABNORMAL
AST SERPL-CCNC: 14 U/L (ref 15–37)
BACTERIA URNS QL MICRO: ABNORMAL /HPF
BASOPHILS # BLD: 0.1 K/UL (ref 0–0.1)
BASOPHILS NFR BLD: 1 % (ref 0–1)
BILIRUB SERPL-MCNC: 1.7 MG/DL (ref 0.2–1)
BILIRUB UR QL: NEGATIVE
BUN SERPL-MCNC: 10 MG/DL (ref 6–20)
BUN/CREAT SERPL: 12 (ref 12–20)
CALCIUM SERPL-MCNC: 8.3 MG/DL (ref 8.5–10.1)
CHLORIDE SERPL-SCNC: 105 MMOL/L (ref 97–108)
CO2 SERPL-SCNC: 27 MMOL/L (ref 21–32)
COLOR UR: ABNORMAL
CREAT SERPL-MCNC: 0.82 MG/DL (ref 0.55–1.02)
DIFFERENTIAL METHOD BLD: ABNORMAL
EOSINOPHIL # BLD: 0.3 K/UL (ref 0–0.4)
EOSINOPHIL NFR BLD: 2 % (ref 0–7)
EPITH CASTS URNS QL MICRO: ABNORMAL /LPF
ERYTHROCYTE [DISTWIDTH] IN BLOOD BY AUTOMATED COUNT: 11.9 % (ref 11.5–14.5)
GLOBULIN SER CALC-MCNC: 3.5 G/DL (ref 2–4)
GLUCOSE SERPL-MCNC: 93 MG/DL (ref 65–100)
GLUCOSE UR STRIP.AUTO-MCNC: NEGATIVE MG/DL
HCG UR QL: NEGATIVE
HCT VFR BLD AUTO: 34 % (ref 35–47)
HGB BLD-MCNC: 11.1 G/DL (ref 11.5–16)
HGB UR QL STRIP: ABNORMAL
IMM GRANULOCYTES # BLD AUTO: 0 K/UL (ref 0–0.04)
IMM GRANULOCYTES NFR BLD AUTO: 0 % (ref 0–0.5)
KETONES UR QL STRIP.AUTO: 80 MG/DL
LEUKOCYTE ESTERASE UR QL STRIP.AUTO: ABNORMAL
LIPASE SERPL-CCNC: 73 U/L (ref 73–393)
LYMPHOCYTES # BLD: 1.9 K/UL (ref 0.8–3.5)
LYMPHOCYTES NFR BLD: 13 % (ref 12–49)
MCH RBC QN AUTO: 30.1 PG (ref 26–34)
MCHC RBC AUTO-ENTMCNC: 32.6 G/DL (ref 30–36.5)
MCV RBC AUTO: 92.1 FL (ref 80–99)
MONOCYTES # BLD: 1.1 K/UL (ref 0–1)
MONOCYTES NFR BLD: 8 % (ref 5–13)
MUCOUS THREADS URNS QL MICRO: ABNORMAL /LPF
NEUTS SEG # BLD: 10.5 K/UL (ref 1.8–8)
NEUTS SEG NFR BLD: 76 % (ref 32–75)
NITRITE UR QL STRIP.AUTO: NEGATIVE
NRBC # BLD: 0 K/UL (ref 0–0.01)
NRBC BLD-RTO: 0 PER 100 WBC
PH UR STRIP: 6.5 [PH] (ref 5–8)
PLATELET # BLD AUTO: 217 K/UL (ref 150–400)
PMV BLD AUTO: 9.7 FL (ref 8.9–12.9)
POTASSIUM SERPL-SCNC: 4.1 MMOL/L (ref 3.5–5.1)
PROT SERPL-MCNC: 7.5 G/DL (ref 6.4–8.2)
PROT UR STRIP-MCNC: ABNORMAL MG/DL
RBC # BLD AUTO: 3.69 M/UL (ref 3.8–5.2)
RBC #/AREA URNS HPF: ABNORMAL /HPF (ref 0–5)
SODIUM SERPL-SCNC: 137 MMOL/L (ref 136–145)
SP GR UR REFRACTOMETRY: 1.02
UA: UC IF INDICATED,UAUC: ABNORMAL
UROBILINOGEN UR QL STRIP.AUTO: 1 EU/DL (ref 0.2–1)
WBC # BLD AUTO: 13.8 K/UL (ref 3.6–11)
WBC URNS QL MICRO: ABNORMAL /HPF (ref 0–4)

## 2022-08-15 PROCEDURE — 74011250636 HC RX REV CODE- 250/636: Performed by: EMERGENCY MEDICINE

## 2022-08-15 PROCEDURE — 74018 RADEX ABDOMEN 1 VIEW: CPT

## 2022-08-15 PROCEDURE — 81025 URINE PREGNANCY TEST: CPT

## 2022-08-15 PROCEDURE — 36415 COLL VENOUS BLD VENIPUNCTURE: CPT

## 2022-08-15 PROCEDURE — 85025 COMPLETE CBC W/AUTO DIFF WBC: CPT

## 2022-08-15 PROCEDURE — 99284 EMERGENCY DEPT VISIT MOD MDM: CPT

## 2022-08-15 PROCEDURE — 87086 URINE CULTURE/COLONY COUNT: CPT

## 2022-08-15 PROCEDURE — 83690 ASSAY OF LIPASE: CPT

## 2022-08-15 PROCEDURE — 80053 COMPREHEN METABOLIC PANEL: CPT

## 2022-08-15 PROCEDURE — 76705 ECHO EXAM OF ABDOMEN: CPT

## 2022-08-15 PROCEDURE — 81001 URINALYSIS AUTO W/SCOPE: CPT

## 2022-08-15 PROCEDURE — 74011250637 HC RX REV CODE- 250/637: Performed by: EMERGENCY MEDICINE

## 2022-08-15 PROCEDURE — 74011000250 HC RX REV CODE- 250: Performed by: EMERGENCY MEDICINE

## 2022-08-15 RX ORDER — HYDROCODONE BITARTRATE AND ACETAMINOPHEN 5; 325 MG/1; MG/1
1 TABLET ORAL
Qty: 10 TABLET | Refills: 0 | Status: SHIPPED | OUTPATIENT
Start: 2022-08-15 | End: 2022-08-18

## 2022-08-15 RX ORDER — FAMOTIDINE 20 MG/1
20 TABLET, FILM COATED ORAL 2 TIMES DAILY
Qty: 20 TABLET | Refills: 0 | Status: SHIPPED | OUTPATIENT
Start: 2022-08-15 | End: 2022-08-25

## 2022-08-15 RX ORDER — ONDANSETRON 4 MG/1
4 TABLET, ORALLY DISINTEGRATING ORAL
Qty: 10 TABLET | Refills: 0 | Status: SHIPPED | OUTPATIENT
Start: 2022-08-15

## 2022-08-15 RX ORDER — HYOSCYAMINE SULFATE 0.12 MG/1
0.12 TABLET SUBLINGUAL
Status: DISCONTINUED | OUTPATIENT
Start: 2022-08-15 | End: 2022-08-15 | Stop reason: HOSPADM

## 2022-08-15 RX ADMIN — ALUMINUM HYDROXIDE AND MAGNESIUM HYDROXIDE 40 ML: 200; 200 SUSPENSION ORAL at 03:07

## 2022-08-15 RX ADMIN — SODIUM CHLORIDE 1000 ML: 9 INJECTION, SOLUTION INTRAVENOUS at 03:07

## 2022-08-15 RX ADMIN — HYOSCYAMINE SULFATE 0.12 MG: 0.12 TABLET ORAL; SUBLINGUAL at 03:07

## 2022-08-16 LAB
BACTERIA SPEC CULT: NORMAL
SERVICE CMNT-IMP: NORMAL

## 2022-08-16 NOTE — ED PROVIDER NOTES
EMERGENCY DEPARTMENT HISTORY AND PHYSICAL EXAM      Date: 8/15/2022  Patient Name: Rajani Lindsay    History of Presenting Illness     Chief Complaint   Patient presents with    Abdominal Pain     ED visit d/t abd pain, upper aspect - onset of sxs, Friday - \" feeling bloated and not the same \" - seen at urgent care and given medications, at home reports having large BM with no relief - Denies sob / cp / fevers/ chills / similar sxs in the past;;         History Provided By: Patient    HPI: Rajani Lindsay, 39 y.o. female presents to the ED with cc of abdominal pain. Pt states she began with abdominal pain days ago. She thought she was constipated and was told bu urgent care that she was constipated and told to drink Mag Citrate which she did and then drank a 2nd bottle. She states she had a very large bowel movement and felt like there couldn't be anything left in there. Her pain continued today moderate in severity in epigastric region, cramping in nature. There has been no n//v. She denies fever/chills. She denies CP/SOA. There has been no urinary problems, vag dc or bleeding. No recent tick bite or rashes. She denies any headache, loss of sensation or loss of strength. There are no other complaints, changes, or physical findings at this time. PCP: Malik López MD    No current facility-administered medications on file prior to encounter. Current Outpatient Medications on File Prior to Encounter   Medication Sig Dispense Refill    dextroamphetamine-amphetamine (AdderalL) 10 mg tablet Adderall 10 mg tablet   prescribed by [facility] provider      clotrimazole-betamethasone (LOTRISONE) topical cream Apply  to affected area two (2) times a day. 15 g 0    DAILY MULTI-VITAMIN PO 1 Tablet daily.       levonorgestreL (MIRENA) 20 mcg/24 hours (5 yrs) 52 mg IUD Mirena 20 mcg/24 hours (5 yrs) 52 mg intrauterine device   Inserted 8/11/15      dextroamphetamine-amphetamine (ADDERALL) 20 mg tablet          Past History Past Medical History:  Past Medical History:   Diagnosis Date    Anemia NEC     HX OTHER MEDICAL     ADHD     HX OTHER MEDICAL     dysmenorrhea    HX OTHER MEDICAL     gardisil completed    Rh negative status during pregnancy, antepartum 2012       Past Surgical History:  Past Surgical History:   Procedure Laterality Date    HX  SECTION      HX WISDOM TEETH EXTRACTION  2001    [place]  DELIVERY ONLY         Family History:  Family History   Problem Relation Age of Onset    Stroke Maternal Grandmother     Diabetes Maternal Grandmother     Cancer Maternal Grandfather     Diabetes Maternal Grandfather     Heart Disease Paternal Grandfather     Cancer Mother         Throat and Tongue    Cancer Paternal Grandmother         Breast       Social History:  Social History     Tobacco Use    Smoking status: Never    Smokeless tobacco: Never   Vaping Use    Vaping Use: Never used   Substance Use Topics    Alcohol use: No     Alcohol/week: 0.0 standard drinks    Drug use: No       Allergies:  No Known Allergies      Review of Systems   Review of Systems   Constitutional:  Positive for fatigue. Negative for appetite change, chills and fever. HENT: Negative. Negative for congestion, rhinorrhea, sinus pressure and sore throat. Eyes: Negative. Respiratory: Negative. Negative for cough, choking, chest tightness, shortness of breath and wheezing. Cardiovascular: Negative. Negative for chest pain, palpitations and leg swelling. Gastrointestinal:  Positive for abdominal pain and constipation. Negative for diarrhea, nausea and vomiting. Endocrine: Negative. Genitourinary: Negative. Negative for difficulty urinating, dysuria, flank pain and urgency. Musculoskeletal: Negative. Skin: Negative. Negative for rash. Neurological:  Positive for headaches. Negative for dizziness, speech difficulty, weakness, light-headedness and numbness. Psychiatric/Behavioral: Negative.      All other systems reviewed and are negative. Physical Exam   Physical Exam  Vitals and nursing note reviewed. Constitutional:       General: She is not in acute distress. Appearance: She is well-developed. She is not diaphoretic. HENT:      Head: Normocephalic and atraumatic. Nose: Nose normal.      Mouth/Throat:      Mouth: Mucous membranes are dry. Pharynx: No oropharyngeal exudate. Comments: Dry mucous membranes  Eyes:      Extraocular Movements: Extraocular movements intact. Conjunctiva/sclera: Conjunctivae normal.      Pupils: Pupils are equal, round, and reactive to light. Neck:      Vascular: No JVD. Trachea: No tracheal deviation. Cardiovascular:      Rate and Rhythm: Normal rate and regular rhythm. Heart sounds: Normal heart sounds. No murmur heard. Pulmonary:      Effort: Pulmonary effort is normal. No respiratory distress. Breath sounds: Normal breath sounds. No stridor. No wheezing or rales. Abdominal:      General: Abdomen is flat. There is no distension. Palpations: Abdomen is soft. Tenderness: There is abdominal tenderness in the right upper quadrant and epigastric area. There is no guarding or rebound. Musculoskeletal:         General: Normal range of motion. Cervical back: Normal range of motion and neck supple. Right lower leg: No edema. Left lower leg: No edema. Skin:     General: Skin is warm and dry. Capillary Refill: Capillary refill takes less than 2 seconds. Findings: Rash present. Neurological:      Mental Status: She is alert and oriented to person, place, and time. Cranial Nerves: No cranial nerve deficit.       Comments: No gross motor or sensory deficits    Psychiatric:         Mood and Affect: Mood normal.         Behavior: Behavior normal.       Diagnostic Study Results     Labs -  Recent Results (from the past 24 hour(s))   CBC WITH AUTOMATED DIFF    Collection Time: 08/15/22 12:58 AM   Result Value Ref Range    WBC 13.8 (H) 3.6 - 11.0 K/uL    RBC 3.69 (L) 3.80 - 5.20 M/uL    HGB 11.1 (L) 11.5 - 16.0 g/dL    HCT 34.0 (L) 35.0 - 47.0 %    MCV 92.1 80.0 - 99.0 FL    MCH 30.1 26.0 - 34.0 PG    MCHC 32.6 30.0 - 36.5 g/dL    RDW 11.9 11.5 - 14.5 %    PLATELET 325 412 - 985 K/uL    MPV 9.7 8.9 - 12.9 FL    NRBC 0.0 0  WBC    ABSOLUTE NRBC 0.00 0.00 - 0.01 K/uL    NEUTROPHILS 76 (H) 32 - 75 %    LYMPHOCYTES 13 12 - 49 %    MONOCYTES 8 5 - 13 %    EOSINOPHILS 2 0 - 7 %    BASOPHILS 1 0 - 1 %    IMMATURE GRANULOCYTES 0 0.0 - 0.5 %    ABS. NEUTROPHILS 10.5 (H) 1.8 - 8.0 K/UL    ABS. LYMPHOCYTES 1.9 0.8 - 3.5 K/UL    ABS. MONOCYTES 1.1 (H) 0.0 - 1.0 K/UL    ABS. EOSINOPHILS 0.3 0.0 - 0.4 K/UL    ABS. BASOPHILS 0.1 0.0 - 0.1 K/UL    ABS. IMM. GRANS. 0.0 0.00 - 0.04 K/UL    DF AUTOMATED     METABOLIC PANEL, COMPREHENSIVE    Collection Time: 08/15/22 12:58 AM   Result Value Ref Range    Sodium 137 136 - 145 mmol/L    Potassium 4.1 3.5 - 5.1 mmol/L    Chloride 105 97 - 108 mmol/L    CO2 27 21 - 32 mmol/L    Anion gap 5 5 - 15 mmol/L    Glucose 93 65 - 100 mg/dL    BUN 10 6 - 20 MG/DL    Creatinine 0.82 0.55 - 1.02 MG/DL    BUN/Creatinine ratio 12 12 - 20      GFR est AA >60 >60 ml/min/1.73m2    GFR est non-AA >60 >60 ml/min/1.73m2    Calcium 8.3 (L) 8.5 - 10.1 MG/DL    Bilirubin, total 1.7 (H) 0.2 - 1.0 MG/DL    ALT (SGPT) 18 12 - 78 U/L    AST (SGOT) 14 (L) 15 - 37 U/L    Alk.  phosphatase 57 45 - 117 U/L    Protein, total 7.5 6.4 - 8.2 g/dL    Albumin 4.0 3.5 - 5.0 g/dL    Globulin 3.5 2.0 - 4.0 g/dL    A-G Ratio 1.1 1.1 - 2.2     LIPASE    Collection Time: 08/15/22 12:58 AM   Result Value Ref Range    Lipase 73 73 - 393 U/L   URINALYSIS W/ REFLEX CULTURE    Collection Time: 08/15/22 12:58 AM    Specimen: Urine   Result Value Ref Range    Color YELLOW/STRAW      Appearance CLOUDY (A) CLEAR      Specific gravity 1.022      pH (UA) 6.5 5.0 - 8.0      Protein TRACE (A) NEG mg/dL    Glucose Negative NEG mg/dL    Ketone 80 (A) NEG mg/dL    Bilirubin Negative NEG      Blood LARGE (A) NEG      Urobilinogen 1.0 0.2 - 1.0 EU/dL    Nitrites Negative NEG      Leukocyte Esterase MODERATE (A) NEG      WBC 10-20 0 - 4 /hpf    RBC 10-20 0 - 5 /hpf    Epithelial cells MODERATE (A) FEW /lpf    Bacteria 2+ (A) NEG /hpf    UA:UC IF INDICATED URINE CULTURE ORDERED (A) CNI      Mucus 3+ (A) NEG /lpf   HCG URINE, QL. - POC    Collection Time: 08/15/22  1:00 AM   Result Value Ref Range    Pregnancy test,urine (POC) Negative NEG           Radiologic Studies -   US ABD LTD   Final Result   Normal study          XR ABD (KUB)   Final Result   Normal abdomen. CT Results  (Last 48 hours)      None          CXR Results  (Last 48 hours)      None            Medical Decision Making   I am the first provider for this patient. I reviewed the vital signs, available nursing notes, past medical history, past surgical history, family history and social history. Vital Signs-Reviewed the patient's vital signs. Records Reviewed: Nursing Notes    Provider Notes (Medical Decision Making):   DDx- Dehydration secondary to laxative us, Biliary colic, dyspepsia, pancreatitis, constipation    ED Course:   Initial assessment performed. The patients presenting problems have been discussed, and they are in agreement with the care plan formulated and outlined with them. I have encouraged them to ask questions as they arise throughout their visit. Pt did have some improvement in pain. Labs unremarkable, US unremarkable. Discussed results with pt, may all be related to cramping from Mag Citrate      Disposition:    DC- Adult Discharges: All of the diagnostic tests were reviewed and questions answered. Diagnosis, care plan and treatment options were discussed. The patient understands the instructions and will follow up as directed. The patients results have been reviewed with them. They have been counseled regarding their diagnosis.   The patient verbally convey understanding and agreement of the signs, symptoms, diagnosis, treatment and prognosis and additionally agrees to follow up as recommended with their PCP in 24 - 48 hours. They also agree with the care-plan and convey that all of their questions have been answered. I have also put together some discharge instructions for them that include: 1) educational information regarding their diagnosis, 2) how to care for their diagnosis at home, as well a 3) list of reasons why they would want to return to the ED prior to their follow-up appointment, should their condition change. DISCHARGE PLAN:  1. Discharge Medication List as of 8/15/2022  4:53 AM        START taking these medications    Details   HYDROcodone-acetaminophen (Norco) 5-325 mg per tablet Take 1 Tablet by mouth every six (6) hours as needed for Pain for up to 3 days. Max Daily Amount: 4 Tablets., Normal, Disp-10 Tablet, R-0      famotidine (Pepcid) 20 mg tablet Take 1 Tablet by mouth two (2) times a day for 10 days. , Normal, Disp-20 Tablet, R-0      ondansetron (ZOFRAN ODT) 4 mg disintegrating tablet Take 1 Tablet by mouth every eight (8) hours as needed for Nausea or Vomiting., Normal, Disp-10 Tablet, R-0           CONTINUE these medications which have NOT CHANGED    Details   dextroamphetamine-amphetamine (AdderalL) 10 mg tablet Adderall 10 mg tablet   prescribed by non 606/706 [facility] provider, Historical Med      clotrimazole-betamethasone (LOTRISONE) topical cream Apply  to affected area two (2) times a day., Normal, Disp-15 g, R-0      DAILY MULTI-VITAMIN PO 1 Tablet daily. , Historical Med      levonorgestreL (MIRENA) 20 mcg/24 hours (5 yrs) 52 mg IUD Mirena 20 mcg/24 hours (5 yrs) 52 mg intrauterine device   Inserted 8/11/15, Historical Med      dextroamphetamine-amphetamine (ADDERALL) 20 mg tablet Historical Med           2.    Follow-up Information       Follow up With Specialties Details Why Collette Baird MD Internal Medicine Physician   [id number]  [id number]  [phone]            3. Return to ED if worse     Diagnosis     Clinical Impression:   1. Abdominal pain, epigastric        Attestations:    Liana Luong, DO        Please note that this dictation was completed with Animated Speech, the computer voice recognition software. Quite often unanticipated grammatical, syntax, homophones, and other interpretive errors are inadvertently transcribed by the computer software. Please disregard these errors. Please excuse any errors that have escaped final proofreading. Thank you.

## 2023-01-03 ENCOUNTER — OFFICE VISIT (OUTPATIENT)
Dept: PRIMARY CARE CLINIC | Age: 38
End: 2023-01-03

## 2023-01-03 VITALS
HEART RATE: 71 BPM | BODY MASS INDEX: 21.48 KG/M2 | RESPIRATION RATE: 16 BRPM | SYSTOLIC BLOOD PRESSURE: 111 MMHG | HEIGHT: 63 IN | TEMPERATURE: 98.7 F | DIASTOLIC BLOOD PRESSURE: 76 MMHG | WEIGHT: 121.2 LBS | OXYGEN SATURATION: 97 %

## 2023-01-03 DIAGNOSIS — N30.01 ACUTE CYSTITIS WITH HEMATURIA: Primary | ICD-10-CM

## 2023-01-03 DIAGNOSIS — R39.9 UTI SYMPTOMS: ICD-10-CM

## 2023-01-03 LAB
BILIRUB UR QL STRIP: NEGATIVE
GLUCOSE UR-MCNC: NORMAL MG/DL
HCG URINE, QL. (POC): NEGATIVE
KETONES P FAST UR STRIP-MCNC: NORMAL MG/DL
PH UR STRIP: 5.5 [PH] (ref 4.6–8)
PROT UR QL STRIP: NORMAL
SP GR UR STRIP: 1 (ref 1–1.03)
UA UROBILINOGEN AMB POC: NORMAL (ref 0.2–1)
URINALYSIS CLARITY POC: NORMAL
URINALYSIS COLOR POC: NORMAL
URINE BLOOD POC: NORMAL
URINE LEUKOCYTES POC: NORMAL
URINE NITRITES POC: POSITIVE
VALID INTERNAL CONTROL?: YES

## 2023-01-03 PROCEDURE — 81025 URINE PREGNANCY TEST: CPT | Performed by: NURSE PRACTITIONER

## 2023-01-03 PROCEDURE — 99214 OFFICE O/P EST MOD 30 MIN: CPT | Performed by: NURSE PRACTITIONER

## 2023-01-03 PROCEDURE — 81002 URINALYSIS NONAUTO W/O SCOPE: CPT | Performed by: NURSE PRACTITIONER

## 2023-01-03 RX ORDER — PHENAZOPYRIDINE HYDROCHLORIDE 200 MG/1
200 TABLET, FILM COATED ORAL
Qty: 6 TABLET | Refills: 0 | Status: SHIPPED | OUTPATIENT
Start: 2023-01-03 | End: 2023-01-05

## 2023-01-03 RX ORDER — NITROFURANTOIN 25; 75 MG/1; MG/1
100 CAPSULE ORAL 2 TIMES DAILY
Qty: 10 CAPSULE | Refills: 0 | Status: SHIPPED | OUTPATIENT
Start: 2023-01-03 | End: 2023-01-08

## 2023-01-03 NOTE — PROGRESS NOTES
Identified pt with two pt identifiers(name and ). Reviewed record in preparation for visit and have obtained necessary documentation. Chief Complaint   Patient presents with    Bladder Infection      evening. Very painful, could not sleep. Vitals:    23 1116   BP: 111/76   Pulse: 71   Resp: 16   Temp: 98.7 °F (37.1 °C)   TempSrc: Temporal   SpO2: 97%   Weight: 121 lb 3.2 oz (55 kg)   Height: 5' 3\" (1.6 m)   PainSc:   6       Health Maintenance Due   Topic    Hepatitis C Screening     Cervical cancer screen     COVID-19 Vaccine (3 - Booster for Moderna series)    Flu Vaccine (1)    Depression Screen        Coordination of Care Questionnaire:  :   1) Have you been to an emergency room, urgent care, or hospitalized since your last visit? If yes, where when, and reason for visit? no       2. Have seen or consulted any other health care provider since your last visit? If yes, where when, and reason for visit? NO      Patient is accompanied by self I have received verbal consent from Earma Rosamond to discuss any/all medical information while they are present in the room. An electronic signature was used to authenticate this note.   -- Jg Adamson LPN

## 2023-01-03 NOTE — PROGRESS NOTES
Chief Complaint   Patient presents with    Bladder Infection      evening. Very painful, could not sleep. HISTORY OF PRESENT ILLNESS  Han Colon is a 44 y.o. female. Patient reports urinary pain onset three days ago. She denies fever, abdominal, or back pain. She reports hx IUD and  hx of vasectomy but concerned last time she had UTI she was pregnant. Review of Systems   Constitutional: Negative. Gastrointestinal: Negative. Genitourinary:  Positive for dysuria, frequency and urgency. Physical Exam  Vitals and nursing note reviewed. Cardiovascular:      Rate and Rhythm: Normal rate. Pulmonary:      Effort: Pulmonary effort is normal.   Abdominal:      Tenderness: There is no right CVA tenderness or left CVA tenderness. Musculoskeletal:      Cervical back: Neck supple. Neurological:      Mental Status: She is alert.      Past Medical History:   Diagnosis Date    Anemia NEC     HX OTHER MEDICAL     ADHD     HX OTHER MEDICAL     dysmenorrhea    HX OTHER MEDICAL     gardisil completed    Rh negative status during pregnancy, antepartum 2012     Past Surgical History:   Procedure Laterality Date    HX  SECTION      HX WISDOM TEETH EXTRACTION      [place]       /76 (BP 1 Location: Left arm, BP Patient Position: Sitting, BP Cuff Size: Child)   Pulse 71   Temp 98.7 °F (37.1 °C) (Temporal)   Resp 16   Ht 5' 3\" (1.6 m)   Wt 121 lb 3.2 oz (55 kg)   SpO2 97%   BMI 21.47 kg/m²   Results for orders placed or performed in visit on 23   AMB POC URINALYSIS DIP STICK MANUAL W/O MICRO   Result Value Ref Range    Color (UA POC) Orange     Clarity (UA POC) Cloudy     Glucose (UA POC) Trace Negative    Bilirubin (UA POC) Negative Negative    Ketones (UA POC) Trace Negative    Specific gravity (UA POC) 1.005 1.001 - 1.035    Blood (UA POC) 2+ Negative    pH (UA POC) 5.5 4.6 - 8.0    Protein (UA POC) 1+ Negative    Urobilinogen (UA POC) 0.2 mg/dL 0.2 - 1    Nitrites (UA POC) Positive Negative    Leukocyte esterase (UA POC) 3+ Negative   AMB POC URINE PREGNANCY TEST, VISUAL COLOR COMPARISON   Result Value Ref Range    VALID INTERNAL CONTROL POC Yes     HCG urine, Ql. (POC) Negative Negative        ASSESSMENT and PLAN  1. Acute cystitis with hematuria  -     CULTURE, URINE; Future  -     nitrofurantoin, macrocrystal-monohydrate, (MACROBID) 100 mg capsule; Take 1 Capsule by mouth two (2) times a day for 5 days. , Normal, Disp-10 Capsule, R-0  -     phenazopyridine (PYRIDIUM) 200 mg tablet; Take 1 Tablet by mouth three (3) times daily as needed for Pain for up to 2 days. , Normal, Disp-6 Tablet, R-0  -     AMB POC URINE PREGNANCY TEST, VISUAL COLOR COMPARISON  2. UTI symptoms  -     AMB POC URINALYSIS DIP STICK MANUAL W/O MICRO  -     CULTURE, URINE; Future  Discussed urine dip results and need to treat infection with antibiotic to patient. Will follow urine culture and will notify patient for abnormality. Advised patient to push fluids and monitor for worsening or un resolving symptoms. May take OTC tylenol or ibuprofen for discomfort.  Instructed to follow up for additional care as needed      MALI Cason

## 2023-01-04 LAB
BACTERIA SPEC CULT: NORMAL
SERVICE CMNT-IMP: NORMAL

## 2023-01-06 ENCOUNTER — TELEPHONE (OUTPATIENT)
Dept: PRIMARY CARE CLINIC | Age: 38
End: 2023-01-06

## 2023-01-06 NOTE — TELEPHONE ENCOUNTER
Discussed normal culture result with patient. She reports symptoms have resolved.  Recommended Urology consultation as this is second negative culture      Signed By: MALI Parisi     January 6, 2023